# Patient Record
Sex: FEMALE | Race: WHITE | URBAN - METROPOLITAN AREA
[De-identification: names, ages, dates, MRNs, and addresses within clinical notes are randomized per-mention and may not be internally consistent; named-entity substitution may affect disease eponyms.]

---

## 2017-01-13 NOTE — PATIENT DISCUSSION
12 2 16 MOD SRF AT 6 1/2 WKS, RECOM EYLEA TREATMENT AND REPEAT IN 5 WKS TO SEE IF POSSIBLE TO CLEAR REAMAING FLUID AND IMPROVE HIS VISION

## 2017-01-13 NOTE — PATIENT DISCUSSION
Cataract APPEARS VISUALLY SIGNIFICANT and patient advised to SEE A CATARACT SURGEON for evaluation and treatment.

## 2017-01-13 NOTE — PROCEDURE NOTE: CLINICAL
PROCEDURE NOTE: Eylea 2mg OS. Diagnosis: Neovascular AMD with Active CNV. Anesthesia: Topical. Prep: Betadine Drops. Prior to injection, risks/benefits/alternatives discussed including infection, loss of vision, hemorrhage, cataract, glaucoma, retinal tears or detachment. The patient wished to proceed with treatment. Betadine prep was performed. Topical anesthesia was induced with Alcaine. Additional anesthesia was achieved using drop(s) or injection checked above. A drop of Povidone-iodine 5% ophthalmic solution was instilled over the injection site and in the inferior fornix. Using the syringe provided, Eylea 2.0mg in 0.05 cc was injected into the vitreous cavity. The needle was passed 3.0 mm posterior to the limbus in pseudophakic patients, and 3.5 mm posterior to the limbus in phakic patients. Patient tolerated procedure well. There were no complications. Injection time: 2:30 PM. The eye was irrigated with sterile irrigating solution. Post procedure instructions given. The patient was instructed to return for re-evaluation in approximately 4-12 weeks depending on his/her condition and was told to call immediately if vision decreases and/or if his/her eye becomes red, painful, and/or light sensitive. The patient was instructed to go to the emergency room or call 911 if unable to reach the doctor within an hour or two of trying or calling. The patient was instructed to use Artificial Tears q.i.d. p.r.n for comfort. Benitez Alaniz

## 2017-02-15 NOTE — PROCEDURE NOTE: CLINICAL
PROCEDURE NOTE: Eylea 2mg OS. Diagnosis: Neovascular AMD with Active CNV. Anesthesia: Topical. Prep: Betadine Drops. Prior to injection, risks/benefits/alternatives discussed including infection, loss of vision, hemorrhage, cataract, glaucoma, retinal tears or detachment. The patient wished to proceed with treatment. Betadine prep was performed. Topical anesthesia was induced with Alcaine. Additional anesthesia was achieved using drop(s) or injection checked above. A drop of Povidone-iodine 5% ophthalmic solution was instilled over the injection site and in the inferior fornix. Using the syringe provided, Eylea 2.0mg in 0.05 cc was injected into the vitreous cavity. The needle was passed 3.0 mm posterior to the limbus in pseudophakic patients, and 3.5 mm posterior to the limbus in phakic patients. Patient tolerated procedure well. There were no complications. Injection time: 5778. The eye was irrigated with sterile irrigating solution. Post procedure instructions given. The patient was instructed to return for re-evaluation in approximately 4-12 weeks depending on his/her condition and was told to call immediately if vision decreases and/or if his/her eye becomes red, painful, and/or light sensitive. The patient was instructed to go to the emergency room or call 911 if unable to reach the doctor within an hour or two of trying or calling. The patient was instructed to use Artificial Tears q.i.d. p.r.n for comfort. Angela Fontanez

## 2017-03-31 NOTE — PROCEDURE NOTE: CLINICAL
PROCEDURE NOTE: Eylea 2mg OS. Diagnosis: Neovascular AMD with Active CNV. Prior to injection, risks/benefits/alternatives discussed including infection, loss of vision, hemorrhage, cataract, glaucoma, retinal tears or detachment. The patient wished to proceed with treatment. Betadine prep was performed. Topical anesthesia was induced with Alcaine. Additional anesthesia was achieved using drop(s) or injection checked above. A drop of Povidone-iodine 5% ophthalmic solution was instilled over the injection site and in the inferior fornix. Using the syringe provided, Eylea 2.0mg in 0.05 cc was injected into the vitreous cavity. The remainder of the Eylea in the single-use vial was then discarded in a medical waste disposal container. The needle was passed 3.0 mm posterior to the limbus in pseudophakic patients, and 3.5 mm posterior to the limbus in phakic patients. Patient tolerated procedure well. There were no complications. Injection time: 1245PM. The eye was irrigated with sterile irrigating solution. Post procedure instructions given. The patient was instructed to return for re-evaluation in approximately 4-12 weeks depending on his/her condition and was told to call immediately if vision decreases and/or if his/her eye becomes red, painful, and/or light sensitive. The patient was instructed to go to the emergency room or call 911 if unable to reach the doctor within an hour or two of trying or calling. The patient was instructed to use Artificial Tears q.i.d. p.r.n for comfort. Jenna Espinoza

## 2019-01-09 ENCOUNTER — IMPORTED ENCOUNTER (OUTPATIENT)
Dept: URBAN - METROPOLITAN AREA CLINIC 31 | Facility: CLINIC | Age: 59
End: 2019-01-09

## 2019-01-09 PROBLEM — H25.13: Noted: 2019-01-09

## 2019-01-09 PROCEDURE — 92014 COMPRE OPH EXAM EST PT 1/>: CPT

## 2019-01-09 PROCEDURE — 92015 DETERMINE REFRACTIVE STATE: CPT

## 2019-01-09 NOTE — PATIENT DISCUSSION
1.  Nuclear Sclerotic Cataract OU: Explained how cataracts can effect vision. Recommend clinical observation. The patient was advised to contact us if any change or worsening of vision. 2. Refractive error Annual Good ocular health documented. Discussed options of glasses contacts or refractive surgery. Discussed importance of annual eye exams.

## 2022-04-02 ASSESSMENT — VISUAL ACUITY
OD_CC: 20/30
OS_CC: 20/30
OS_CC: J114''
OD_CC: J114''

## 2022-04-02 ASSESSMENT — TONOMETRY
OS_IOP_MMHG: 11
OD_IOP_MMHG: 11

## 2022-07-09 ENCOUNTER — TELEPHONE ENCOUNTER (OUTPATIENT)
Dept: URBAN - METROPOLITAN AREA CLINIC 121 | Facility: CLINIC | Age: 62
End: 2022-07-09

## 2022-07-10 ENCOUNTER — TELEPHONE ENCOUNTER (OUTPATIENT)
Dept: URBAN - METROPOLITAN AREA CLINIC 121 | Facility: CLINIC | Age: 62
End: 2022-07-10

## 2022-07-30 ENCOUNTER — TELEPHONE ENCOUNTER (OUTPATIENT)
Age: 62
End: 2022-07-30

## 2022-07-31 ENCOUNTER — TELEPHONE ENCOUNTER (OUTPATIENT)
Age: 62
End: 2022-07-31

## 2022-12-30 ENCOUNTER — OFFICE VISIT (OUTPATIENT)
Dept: URBAN - METROPOLITAN AREA CLINIC 63 | Facility: CLINIC | Age: 62
End: 2022-12-30
Payer: COMMERCIAL

## 2022-12-30 VITALS
DIASTOLIC BLOOD PRESSURE: 80 MMHG | HEIGHT: 69 IN | HEART RATE: 70 BPM | BODY MASS INDEX: 23.22 KG/M2 | OXYGEN SATURATION: 97 % | WEIGHT: 156.8 LBS | TEMPERATURE: 97.2 F | SYSTOLIC BLOOD PRESSURE: 120 MMHG

## 2022-12-30 DIAGNOSIS — Z12.11 COLON CANCER SCREENING: ICD-10-CM

## 2022-12-30 PROCEDURE — 99203 OFFICE O/P NEW LOW 30 MIN: CPT | Performed by: PHYSICIAN ASSISTANT

## 2022-12-30 RX ORDER — ONDANSETRON HYDROCHLORIDE 4 MG/1
1 TABLET TABLET, FILM COATED ORAL
Qty: 2 | Refills: 0 | OUTPATIENT
Start: 2022-12-30

## 2022-12-30 RX ORDER — MELOXICAM 15 MG/1
1 TABLET TABLET ORAL ONCE A DAY
Status: ACTIVE | COMMUNITY

## 2022-12-30 NOTE — HPI-TODAY'S VISIT:
Jose is a pleasant 62-year-old female who presents today for evaluation of a screening colonoscopy  Labs dated 9/30/2022 showed a normal thyroid panel.  Normal hemoglobin.  Normal platelets.  Normal renal function.  Normal LFTs.  Vitamin B12 unremarkable.  CT abdomen/pelvis with contrast dated 3/28/2022 showed no acute findings.  Ultrasound dated 3/28/2022 showed mild gallbladder distention and wall thickening without visualized shadowing calculi.  The patient does report tenderness on scanning over the gallbladder.  Correlate for acute cholecystitis.  Consider further evaluation with nuclear medicine hepatobiliary scan if indicated.  Patient is colonoscopy naive. Notes 1-2 daily bowel movements with occasional constipation. Denies nausea, vomiting, heartburn, reflux, dysphagia, odynophagia, hematemesis, coffee ground emesis, abdominal pain, change in bowel habits, abnormal weight loss, BRBPR or melena. Denies family history of colon cancer or colon polyps. No other GI complaints at this time

## 2023-01-06 ENCOUNTER — LAB OUTSIDE AN ENCOUNTER (OUTPATIENT)
Dept: URBAN - METROPOLITAN AREA CLINIC 63 | Facility: CLINIC | Age: 63
End: 2023-01-06

## 2023-01-27 ENCOUNTER — OFFICE VISIT (OUTPATIENT)
Dept: URBAN - METROPOLITAN AREA SURGERY CENTER 4 | Facility: SURGERY CENTER | Age: 63
End: 2023-01-27

## 2023-02-03 ENCOUNTER — OFFICE VISIT (OUTPATIENT)
Dept: URBAN - METROPOLITAN AREA SURGERY CENTER 4 | Facility: SURGERY CENTER | Age: 63
End: 2023-02-03

## 2023-02-03 ENCOUNTER — CLAIMS CREATED FROM THE CLAIM WINDOW (OUTPATIENT)
Dept: URBAN - METROPOLITAN AREA SURGERY CENTER 4 | Facility: SURGERY CENTER | Age: 63
End: 2023-02-03
Payer: COMMERCIAL

## 2023-02-03 ENCOUNTER — TELEPHONE ENCOUNTER (OUTPATIENT)
Dept: URBAN - METROPOLITAN AREA CLINIC 64 | Facility: CLINIC | Age: 63
End: 2023-02-03

## 2023-02-03 DIAGNOSIS — Z12.11 COLON CANCER SCREENING: ICD-10-CM

## 2023-02-03 DIAGNOSIS — K64.0 GRADE I INTERNAL HEMORRHOIDS: ICD-10-CM

## 2023-02-03 PROCEDURE — G0121 COLON CA SCRN NOT HI RSK IND: HCPCS | Performed by: INTERNAL MEDICINE

## 2023-02-03 RX ORDER — ONDANSETRON HYDROCHLORIDE 4 MG/1
1 TABLET TABLET, FILM COATED ORAL
Qty: 2 | Refills: 0 | OUTPATIENT
Start: 2023-02-03

## 2023-02-03 RX ORDER — ONDANSETRON HYDROCHLORIDE 4 MG/1
1 TABLET TABLET, FILM COATED ORAL
Qty: 2 | Refills: 0 | Status: ACTIVE | COMMUNITY
Start: 2022-12-30

## 2023-02-03 RX ORDER — POLYETHYLENE GLYCOL 3350, SODIUM CHLORIDE, SODIUM BICARBONATE, POTASSIUM CHLORIDE 420; 11.2; 5.72; 1.48 G/4L; G/4L; G/4L; G/4L
AS DIRECTED POWDER, FOR SOLUTION ORAL ONCE
Qty: 4000 | Refills: 0 | OUTPATIENT
Start: 2023-02-03 | End: 2023-02-04

## 2023-02-03 RX ORDER — MELOXICAM 15 MG/1
1 TABLET TABLET ORAL ONCE A DAY
Status: ACTIVE | COMMUNITY

## 2023-02-10 ENCOUNTER — LAB OUTSIDE AN ENCOUNTER (OUTPATIENT)
Dept: URBAN - METROPOLITAN AREA CLINIC 64 | Facility: CLINIC | Age: 63
End: 2023-02-10

## 2023-02-17 ENCOUNTER — CLAIMS CREATED FROM THE CLAIM WINDOW (OUTPATIENT)
Dept: URBAN - METROPOLITAN AREA CLINIC 4 | Facility: CLINIC | Age: 63
End: 2023-02-17
Payer: COMMERCIAL

## 2023-02-17 ENCOUNTER — OFFICE VISIT (OUTPATIENT)
Dept: URBAN - METROPOLITAN AREA SURGERY CENTER 4 | Facility: SURGERY CENTER | Age: 63
End: 2023-02-17
Payer: COMMERCIAL

## 2023-02-17 DIAGNOSIS — K64.0 GRADE I INTERNAL HEMORRHOIDS: ICD-10-CM

## 2023-02-17 DIAGNOSIS — D12.2 BENIGN NEOPLASM OF ASCENDING COLON: ICD-10-CM

## 2023-02-17 DIAGNOSIS — Z12.11 COLON CANCER SCREENING: ICD-10-CM

## 2023-02-17 PROCEDURE — 45381 COLONOSCOPY SUBMUCOUS NJX: CPT | Performed by: INTERNAL MEDICINE

## 2023-02-17 PROCEDURE — 45380 COLONOSCOPY AND BIOPSY: CPT | Performed by: INTERNAL MEDICINE

## 2023-02-17 PROCEDURE — 88305 TISSUE EXAM BY PATHOLOGIST: CPT | Performed by: PATHOLOGY

## 2023-02-17 RX ORDER — ONDANSETRON HYDROCHLORIDE 4 MG/1
1 TABLET TABLET, FILM COATED ORAL
Qty: 2 | Refills: 0 | Status: ACTIVE | COMMUNITY
Start: 2022-12-30

## 2023-02-17 RX ORDER — MELOXICAM 15 MG/1
1 TABLET TABLET ORAL ONCE A DAY
Status: ACTIVE | COMMUNITY

## 2023-02-17 RX ORDER — ONDANSETRON HYDROCHLORIDE 4 MG/1
1 TABLET TABLET, FILM COATED ORAL
Qty: 2 | Refills: 0 | Status: ACTIVE | COMMUNITY
Start: 2023-02-03

## 2023-02-24 ENCOUNTER — OFFICE VISIT (OUTPATIENT)
Dept: URBAN - METROPOLITAN AREA CLINIC 63 | Facility: CLINIC | Age: 63
End: 2023-02-24

## 2023-03-03 ENCOUNTER — OFFICE VISIT (OUTPATIENT)
Dept: URBAN - METROPOLITAN AREA CLINIC 63 | Facility: CLINIC | Age: 63
End: 2023-03-03
Payer: COMMERCIAL

## 2023-03-03 ENCOUNTER — WEB ENCOUNTER (OUTPATIENT)
Dept: URBAN - METROPOLITAN AREA CLINIC 63 | Facility: CLINIC | Age: 63
End: 2023-03-03

## 2023-03-03 ENCOUNTER — DASHBOARD ENCOUNTERS (OUTPATIENT)
Age: 63
End: 2023-03-03

## 2023-03-03 VITALS
TEMPERATURE: 96.5 F | DIASTOLIC BLOOD PRESSURE: 86 MMHG | HEIGHT: 69 IN | BODY MASS INDEX: 23.4 KG/M2 | OXYGEN SATURATION: 97 % | SYSTOLIC BLOOD PRESSURE: 128 MMHG | WEIGHT: 158 LBS | HEART RATE: 71 BPM

## 2023-03-03 DIAGNOSIS — D12.2 BENIGN NEOPLASM OF ASCENDING COLON: ICD-10-CM

## 2023-03-03 PROCEDURE — 99213 OFFICE O/P EST LOW 20 MIN: CPT | Performed by: PHYSICIAN ASSISTANT

## 2023-03-03 RX ORDER — ONDANSETRON HYDROCHLORIDE 4 MG/1
1 TABLET TABLET, FILM COATED ORAL
Qty: 2 | Refills: 0 | Status: ACTIVE | COMMUNITY
Start: 2022-12-30

## 2023-03-03 RX ORDER — ONDANSETRON HYDROCHLORIDE 4 MG/1
1 TABLET TABLET, FILM COATED ORAL
Qty: 2 | Refills: 0 | Status: ACTIVE | COMMUNITY
Start: 2023-02-03

## 2023-03-03 RX ORDER — MELOXICAM 15 MG/1
1 TABLET TABLET ORAL ONCE A DAY
Status: ACTIVE | COMMUNITY

## 2023-03-03 NOTE — HPI-TODAY'S VISIT:
Jose is a pleasant 63-year-old female who presents today for a procedure follow up.  Colonoscopy dated 2/17/2023 demonstrated a 10 mm sessile serrated adenoma removed from the mid ascending colon.  1 hemostatic clip was successfully placed.  8 mm sessile serrated adenoma removed from the mid ascending colon.  1 hemostatic clip was successfully placed.  Mild nonbleeding internal hemorrhoids were present upon retroflexion.  Colonoscopy dated 2/3/2023 showed poor preparation of the colon.  Nonbleeding internal hemorrhoids.  No specimens collected.  Procedure was aborted due to inadequate bowel prep.  Labs dated 9/30/2022 showed a normal thyroid panel.  Normal hemoglobin.  Normal platelets.  Normal renal function.  Normal LFTs.  Vitamin B12 unremarkable.  CT abdomen/pelvis with contrast dated 3/28/2022 showed no acute findings.  Ultrasound dated 3/28/2022 showed mild gallbladder distention and wall thickening without visualized shadowing calculi.  The patient does report tenderness on scanning over the gallbladder.  Correlate for acute cholecystitis.  Consider further evaluation with nuclear medicine hepatobiliary scan if indicated.  No issues after procedure. Notes a bowel movement every other day with MiraLAX. Denies nausea, vomiting, heartburn, reflux, dysphagia, odynophagia, hematemesis, coffee ground emesis, abdominal pain, change in bowel habits, abnormal weight loss, BRBPR or melena. Denies family history of colon cancer or colon polyps. No other GI complaints at this time

## 2025-03-26 ENCOUNTER — P2P PATIENT RECORD (OUTPATIENT)
Age: 65
End: 2025-03-26

## 2025-03-26 ENCOUNTER — TELEPHONE ENCOUNTER (OUTPATIENT)
Dept: URBAN - METROPOLITAN AREA CLINIC 63 | Facility: CLINIC | Age: 65
End: 2025-03-26

## 2025-04-07 ENCOUNTER — OFFICE VISIT (OUTPATIENT)
Dept: URBAN - METROPOLITAN AREA CLINIC 63 | Facility: CLINIC | Age: 65
End: 2025-04-07
Payer: MEDICARE

## 2025-04-07 ENCOUNTER — LAB OUTSIDE AN ENCOUNTER (OUTPATIENT)
Dept: URBAN - METROPOLITAN AREA CLINIC 63 | Facility: CLINIC | Age: 65
End: 2025-04-07

## 2025-04-07 DIAGNOSIS — K76.0 HEPATIC STEATOSIS: ICD-10-CM

## 2025-04-07 DIAGNOSIS — R14.1 ABDOMINAL GAS PAIN: ICD-10-CM

## 2025-04-07 DIAGNOSIS — D21.9 FIBROIDS: ICD-10-CM

## 2025-04-07 DIAGNOSIS — R14.0 BLOATING: ICD-10-CM

## 2025-04-07 DIAGNOSIS — Z12.11 COLON CANCER SCREENING: ICD-10-CM

## 2025-04-07 PROBLEM — 116289008: Status: ACTIVE | Noted: 2025-04-07

## 2025-04-07 PROBLEM — 45979003: Status: ACTIVE | Noted: 2025-04-07

## 2025-04-07 PROBLEM — 197321007: Status: ACTIVE | Noted: 2025-04-07

## 2025-04-07 PROBLEM — 146801000119103: Status: ACTIVE | Noted: 2025-04-07

## 2025-04-07 PROBLEM — 305058001: Status: ACTIVE | Noted: 2025-04-07

## 2025-04-07 PROCEDURE — 99214 OFFICE O/P EST MOD 30 MIN: CPT

## 2025-04-07 RX ORDER — ONDANSETRON HYDROCHLORIDE 4 MG/1
1 TABLET TABLET, FILM COATED ORAL
Qty: 2 | Refills: 0 | Status: ACTIVE | COMMUNITY
Start: 2023-02-03

## 2025-04-07 RX ORDER — HYOSCYAMINE SULFATE 0.12 MG/1
PLACE ONE TABLET UNDER THE TOUNGE AND ALLOW TO DISSOLVE BY MOUTH EVERY 4 HOURS AS NEEDED TABLET, ORALLY DISINTEGRATING ORAL
Qty: 40 UNSPECIFIED | Refills: 0 | Status: ACTIVE | COMMUNITY

## 2025-04-07 RX ORDER — PANTOPRAZOLE SODIUM 40 MG/1
TAKE ONE TABLET BY MOUTH ONE TIME DAILY AS DIRECTED TABLET, DELAYED RELEASE ORAL
Qty: 90 UNSPECIFIED | Refills: 1 | Status: ACTIVE | COMMUNITY

## 2025-04-07 RX ORDER — MELOXICAM 15 MG/1
1 TABLET TABLET ORAL ONCE A DAY
Status: ACTIVE | COMMUNITY

## 2025-04-07 RX ORDER — TRAMADOL HYDROCHLORIDE 50 MG/1
TAKE ONE TABLET BY MOUTH EVERY 6 HOURS FOR 7 DAYS TABLET, COATED ORAL
Qty: 28 UNSPECIFIED | Refills: 0 | Status: ACTIVE | COMMUNITY

## 2025-04-07 RX ORDER — CIPROFOLXACIN 500 MG/1
TAKE ONE TABLET BY MOUTH EVERY 12 HOURS FOR 7 DAYS TABLET ORAL
Qty: 14 UNSPECIFIED | Refills: 0 | Status: ACTIVE | COMMUNITY

## 2025-04-07 RX ORDER — BUTALBITAL, ACETAMINOPHEN, AND CAFFEINE 50; 325; 40 MG/1; MG/1; MG/1
TAKE ONE TABLET BY MOUTH EVERY 6 TO 8 HOURS AS DIRECTED TABLET, COATED ORAL
Qty: 120 UNSPECIFIED | Refills: 1 | Status: ACTIVE | COMMUNITY

## 2025-04-07 NOTE — HPI-PREVIOUS IMAGING
3/11/2025 CT abdomen pelvis without contrast consistent with moderate fecal stasis, hepatomegaly with fatty infiltration, calcifications in uterus possibly involving myometrium and possibly endometrium Recommend nonemergent pelvic ultrasound 3/21/2025 transabdominal and transvaginal ultrasound consistent with fibroid uterus with normal endometrial thickness nonvisualization of right ovary and abnormal adnexal mass or fluid collection not apparent   CT abdomen/pelvis with contrast dated 3/28/2022 showed no acute findings.  Ultrasound dated 3/28/2022 showed mild gallbladder distention and wall thickening without visualized shadowing calculi. The patient does report tenderness on scanning over the gallbladder. Correlate for acute cholecystitis. Consider further evaluation with nuclear medicine hepatobiliary scan if indicated.

## 2025-04-07 NOTE — HPI-PREVIOUS LABS
Previous labs with PCP apparently with mild leukocytosis  Labs dated 9/30/2022 showed a normal thyroid panel. Normal hemoglobin. Normal platelets. Normal renal function. Normal LFTs. Vitamin B12 unremarkable.

## 2025-04-07 NOTE — HPI-ZZZTODAY'S VISIT
Patient is a very pleasant 65-year-old female who presents for abdominal pain.  She is a previous patient of Dr. Wong.  Last seen by Bunny Hammer PA-C on 3/3/2023.  Past medical history significant for sessile serrated polyp of the colon, anxiety/depression, allergic rhinitis, GERD, headaches.  Past surgical history significant for cholecystectomy..  Last colonoscopy 2/17/2023.  Recall for 3 years due to greater than 10 mm sessile serrated adenomatous polyp.  History of poor prep.  Family history noncontributory.   Patient presents for chief complaint of abdominal pain.  She relates that for the last several months she has had progressively worsening generalized abdominal discomfort, bloating and lower pelvic pain radiating to her back.  She saw her PCP who did a CT.  Results detailed below with moderate fecal stasis hepatomegaly with fatty infiltration and calcifications in the uterus.  Subsequent transabdominal and transvaginal ultrasound consistent with fibroids in the uterus.  Apparently there was suspicion for UTI for which patient had a UA that was lost at the lab.  Empiric antibiotics were given.  Patient completed antibiotic course for any.  Especially the last week she has had feelings of lethargy and inability to get out of bed lack of appetite and generalized abdominal discomfort every time she eats.  She feels substantially bloated.  She has 1 incomplete bowel movement in the morning that is not diarrhea but in pieces.  She admits to use of magnesium every evening around midnight.  She started this she has been on Dulcolax in the past for constipation as well.  She continues to use pantoprazole 40 mg for acid reflux as well.  She denies dysphagia, melena, hematochezia, fever, chills, nausea, vomiting.

## 2025-05-08 ENCOUNTER — OFFICE VISIT (OUTPATIENT)
Dept: URBAN - METROPOLITAN AREA CLINIC 61 | Facility: CLINIC | Age: 65
End: 2025-05-08

## 2025-06-05 ENCOUNTER — OFFICE VISIT (OUTPATIENT)
Dept: URBAN - METROPOLITAN AREA CLINIC 63 | Facility: CLINIC | Age: 65
End: 2025-06-05